# Patient Record
Sex: FEMALE | Race: WHITE | Employment: OTHER | URBAN - METROPOLITAN AREA
[De-identification: names, ages, dates, MRNs, and addresses within clinical notes are randomized per-mention and may not be internally consistent; named-entity substitution may affect disease eponyms.]

---

## 2022-08-25 ENCOUNTER — TRANSCRIBE ORDER (OUTPATIENT)
Dept: SCHEDULING | Age: 77
End: 2022-08-25

## 2022-08-25 DIAGNOSIS — K58.1 IRRITABLE BOWEL SYNDROME WITH CONSTIPATION: ICD-10-CM

## 2022-08-25 DIAGNOSIS — R10.9 ABDOMINAL PAIN: Primary | ICD-10-CM

## 2022-08-25 DIAGNOSIS — K59.00 CONSTIPATION: ICD-10-CM

## 2022-08-25 DIAGNOSIS — R14.0 BLOATING: ICD-10-CM

## 2022-08-25 DIAGNOSIS — Z80.0 FAMILY HISTORY OF COLON CANCER: ICD-10-CM

## 2022-09-07 ENCOUNTER — TRANSCRIBE ORDER (OUTPATIENT)
Dept: REGISTRATION | Age: 77
End: 2022-09-07

## 2022-09-07 ENCOUNTER — HOSPITAL ENCOUNTER (OUTPATIENT)
Dept: LAB | Age: 77
Discharge: HOME | End: 2022-09-07
Payer: MEDICARE

## 2022-09-07 ENCOUNTER — HOSPITAL ENCOUNTER (OUTPATIENT)
Dept: CT IMAGING | Age: 77
Discharge: HOME | End: 2022-09-07
Payer: MEDICARE

## 2022-09-07 DIAGNOSIS — R14.0 BLOATING: ICD-10-CM

## 2022-09-07 DIAGNOSIS — R10.9 ABDOMINAL PAIN: ICD-10-CM

## 2022-09-07 DIAGNOSIS — Z80.0 FAMILY HISTORY OF COLON CANCER: ICD-10-CM

## 2022-09-07 DIAGNOSIS — R10.9 ABDOMINAL PAIN: Primary | ICD-10-CM

## 2022-09-07 DIAGNOSIS — K59.00 CONSTIPATION: ICD-10-CM

## 2022-09-07 DIAGNOSIS — K58.1 IRRITABLE BOWEL SYNDROME WITH CONSTIPATION: ICD-10-CM

## 2022-09-07 LAB — CREAT SERPL-MCNC: 0.82 MG/DL (ref 0.6–1.3)

## 2022-09-07 PROCEDURE — 74177 CT ABD & PELVIS W/CONTRAST: CPT

## 2022-09-07 PROCEDURE — 74011000636 HC RX REV CODE- 636

## 2022-09-07 PROCEDURE — 82565 ASSAY OF CREATININE: CPT

## 2022-09-07 PROCEDURE — 74011000250 HC RX REV CODE- 250

## 2022-09-07 PROCEDURE — 36415 COLL VENOUS BLD VENIPUNCTURE: CPT

## 2022-09-07 RX ORDER — BARIUM SULFATE 20 MG/ML
450 SUSPENSION ORAL
Status: COMPLETED | OUTPATIENT
Start: 2022-09-07 | End: 2022-09-07

## 2022-09-07 RX ADMIN — BARIUM SULFATE 450 ML: 20 SUSPENSION ORAL at 08:45

## 2022-09-07 RX ADMIN — IOPAMIDOL 98 ML: 755 INJECTION, SOLUTION INTRAVENOUS at 11:46

## 2023-01-05 ENCOUNTER — TRANSCRIBE ORDER (OUTPATIENT)
Dept: REGISTRATION | Age: 78
End: 2023-01-05

## 2023-01-05 DIAGNOSIS — N28.89 RENAL MASS: Primary | ICD-10-CM

## 2023-01-06 ENCOUNTER — HOSPITAL ENCOUNTER (OUTPATIENT)
Dept: LAB | Age: 78
End: 2023-01-06
Payer: MEDICARE

## 2023-01-06 ENCOUNTER — HOSPITAL ENCOUNTER (OUTPATIENT)
Dept: CT IMAGING | Age: 78
Discharge: HOME | End: 2023-01-06
Payer: MEDICARE

## 2023-01-06 DIAGNOSIS — N28.89 RENAL MASS: ICD-10-CM

## 2023-01-06 DIAGNOSIS — N28.89 LEFT RENAL MASS: ICD-10-CM

## 2023-01-06 LAB — CREAT SERPL-MCNC: 0.94 MG/DL (ref 0.6–1.3)

## 2023-01-06 PROCEDURE — 74011000250 HC RX REV CODE- 250

## 2023-01-06 PROCEDURE — 74011000636 HC RX REV CODE- 636

## 2023-01-06 PROCEDURE — 82565 ASSAY OF CREATININE: CPT

## 2023-01-06 PROCEDURE — 36415 COLL VENOUS BLD VENIPUNCTURE: CPT

## 2023-01-06 PROCEDURE — 74170 CT ABD WO CNTRST FLWD CNTRST: CPT

## 2023-01-06 RX ORDER — SODIUM CHLORIDE 0.9 % (FLUSH) 0.9 %
5-10 SYRINGE (ML) INJECTION
Status: COMPLETED | OUTPATIENT
Start: 2023-01-06 | End: 2023-01-06

## 2023-01-06 RX ADMIN — IOPAMIDOL 97 ML: 755 INJECTION, SOLUTION INTRAVENOUS at 14:55

## 2023-01-06 RX ADMIN — Medication 10 ML: at 14:45

## 2023-01-11 NOTE — PROGRESS NOTES
Great news, as expected no mass seen in kidney. She can follow up with me as needed. Cancel upcoming appointment.

## 2023-08-17 ENCOUNTER — HOSPITAL ENCOUNTER (OUTPATIENT)
Age: 78
Discharge: HOME | End: 2023-08-17
Payer: MEDICARE

## 2023-08-17 VITALS — BODY MASS INDEX: 20.02 KG/M2 | WEIGHT: 113 LBS | HEIGHT: 63 IN

## 2023-08-17 DIAGNOSIS — Z12.31 VISIT FOR SCREENING MAMMOGRAM: ICD-10-CM

## 2023-08-17 PROCEDURE — 77063 BREAST TOMOSYNTHESIS BI: CPT

## 2024-05-28 ENCOUNTER — APPOINTMENT (OUTPATIENT)
Age: 79
End: 2024-05-28
Payer: MEDICARE

## 2024-05-28 ENCOUNTER — HOSPITAL ENCOUNTER (EMERGENCY)
Age: 79
Discharge: HOME | End: 2024-05-28
Attending: EMERGENCY MEDICINE
Payer: MEDICARE

## 2024-05-28 VITALS
HEIGHT: 63 IN | WEIGHT: 110 LBS | DIASTOLIC BLOOD PRESSURE: 84 MMHG | SYSTOLIC BLOOD PRESSURE: 149 MMHG | BODY MASS INDEX: 19.49 KG/M2 | OXYGEN SATURATION: 99 % | RESPIRATION RATE: 16 BRPM | HEART RATE: 84 BPM

## 2024-05-28 DIAGNOSIS — R07.81 RIB PAIN ON RIGHT SIDE: Primary | ICD-10-CM

## 2024-05-28 PROCEDURE — 99284 EMERGENCY DEPT VISIT MOD MDM: CPT

## 2024-05-28 PROCEDURE — 6370000000 HC RX 637 (ALT 250 FOR IP): Performed by: EMERGENCY MEDICINE

## 2024-05-28 PROCEDURE — 6360000002 HC RX W HCPCS: Performed by: EMERGENCY MEDICINE

## 2024-05-28 PROCEDURE — 71101 X-RAY EXAM UNILAT RIBS/CHEST: CPT

## 2024-05-28 PROCEDURE — 96372 THER/PROPH/DIAG INJ SC/IM: CPT

## 2024-05-28 RX ORDER — KETOROLAC TROMETHAMINE 30 MG/ML
15 INJECTION, SOLUTION INTRAMUSCULAR; INTRAVENOUS
Status: COMPLETED | OUTPATIENT
Start: 2024-05-28 | End: 2024-05-28

## 2024-05-28 RX ORDER — ONDANSETRON 4 MG/1
4 TABLET, ORALLY DISINTEGRATING ORAL 3 TIMES DAILY PRN
Qty: 10 TABLET | Refills: 0 | Status: SHIPPED | OUTPATIENT
Start: 2024-05-28

## 2024-05-28 RX ORDER — LIDOCAINE 50 MG/G
1 PATCH TOPICAL DAILY
Qty: 10 PATCH | Refills: 0 | Status: SHIPPED | OUTPATIENT
Start: 2024-05-28 | End: 2024-06-07

## 2024-05-28 RX ORDER — KETOROLAC TROMETHAMINE 10 MG/1
10 TABLET, FILM COATED ORAL EVERY 8 HOURS PRN
Qty: 15 TABLET | Refills: 0 | Status: SHIPPED | OUTPATIENT
Start: 2024-05-28

## 2024-05-28 RX ORDER — ACETAMINOPHEN 500 MG
1000 TABLET ORAL
Status: COMPLETED | OUTPATIENT
Start: 2024-05-28 | End: 2024-05-28

## 2024-05-28 RX ORDER — HYDROCODONE BITARTRATE AND ACETAMINOPHEN 5; 325 MG/1; MG/1
1 TABLET ORAL EVERY 6 HOURS PRN
Qty: 10 TABLET | Refills: 0 | Status: SHIPPED | OUTPATIENT
Start: 2024-05-28 | End: 2024-05-31

## 2024-05-28 RX ADMIN — ACETAMINOPHEN 1000 MG: 500 TABLET ORAL at 15:41

## 2024-05-28 RX ADMIN — KETOROLAC TROMETHAMINE 15 MG: 30 INJECTION, SOLUTION INTRAMUSCULAR at 15:41

## 2024-05-28 ASSESSMENT — PAIN DESCRIPTION - LOCATION: LOCATION: RIB CAGE

## 2024-05-28 ASSESSMENT — LIFESTYLE VARIABLES
HOW MANY STANDARD DRINKS CONTAINING ALCOHOL DO YOU HAVE ON A TYPICAL DAY: PATIENT DOES NOT DRINK
HOW OFTEN DO YOU HAVE A DRINK CONTAINING ALCOHOL: NEVER

## 2024-05-28 ASSESSMENT — PAIN - FUNCTIONAL ASSESSMENT
PAIN_FUNCTIONAL_ASSESSMENT: NONE - DENIES PAIN
PAIN_FUNCTIONAL_ASSESSMENT: 0-10

## 2024-05-28 ASSESSMENT — PAIN DESCRIPTION - ORIENTATION: ORIENTATION: RIGHT

## 2024-05-28 NOTE — ED PROVIDER NOTES
[facility] EMERGENCY DEPT  EMERGENCY DEPARTMENT ENCOUNTER       Pt Name: Kathleen Elliott  MRN: [id number]  Birthdate 1945  Date of evaluation: 2024  Provider: Dmitry Bonilla MD   PCP: Mack Sanchez MD  Note Started: 3:59 PM 24     CHIEF COMPLAINT       Chief Complaint   Patient presents with    right rib pain        HISTORY OF PRESENT ILLNESS: 1 or more elements      History From: Patient  History limited by: Nothing     Kathleen Elliott is a 78 y.o. female who presents to the ED, complaining of right rib pain.  Patient reports that this morning was attempting to lift heavy birdbath that fell over her, she reports feeling a pop right lower rib area followed with pain.  She suspects may have broken a rib.  She is not short of breath.  She has no abdomen pain, no nausea or vomiting.     Nursing Notes were all reviewed and agreed with or any disagreements were addressed in the HPI.     REVIEW OF SYSTEMS      Review of Systems     Positives and Pertinent negatives as per HPI.    PAST HISTORY     Past Medical History:  History reviewed. No pertinent past medical history.    Past Surgical History:  Past Surgical History:   Procedure Laterality Date    COLONOSCOPY      HYSTERECTOMY (CERVIX STATUS UNKNOWN)      PARTIAL HYSTERECTOMY (CERVIX NOT REMOVED)      still has 1 ovary done in her 70s at [facility]    TONSILLECTOMY         Family History:  Family History   Problem Relation Age of Onset    Breast Cancer Maternal Cousin        Social History:  Social History     Tobacco Use    Smoking status: Former     Current packs/day: 0.00     Types: Cigarettes     Quit date: 1960     Years since quittin.4    Smokeless tobacco: Never   Substance Use Topics    Alcohol use: No    Drug use: No       Allergies:  No Known Allergies    CURRENT MEDICATIONS      Previous Medications    ASPIRIN 325 MG TABLET    Take 325 mg by mouth daily    CALCIUM CARBONATE-VITAMIN D 600-3.125 MG-MCG TABS    Take 600 mg by mouth

## 2024-05-28 NOTE — ED TRIAGE NOTES
Pt states she was lifting a heavy birdbath that fell over she felt a sharp pain and a pop and thinks she may have broken a rib

## 2024-09-24 ENCOUNTER — TRANSCRIBE ORDERS (OUTPATIENT)
Facility: HOSPITAL | Age: 79
End: 2024-09-24

## 2024-09-24 DIAGNOSIS — Z12.31 SCREENING MAMMOGRAM FOR HIGH-RISK PATIENT: Primary | ICD-10-CM

## 2024-10-01 ENCOUNTER — HOSPITAL ENCOUNTER (OUTPATIENT)
Age: 79
Discharge: HOME | End: 2024-10-04
Payer: MEDICARE

## 2024-10-01 VITALS — BODY MASS INDEX: 19.49 KG/M2 | WEIGHT: 110 LBS | HEIGHT: 63 IN

## 2024-10-01 DIAGNOSIS — Z12.31 ENCOUNTER FOR SCREENING MAMMOGRAM FOR HIGH-RISK PATIENT: ICD-10-CM

## 2024-10-01 PROCEDURE — 77063 BREAST TOMOSYNTHESIS BI: CPT
